# Patient Record
Sex: FEMALE | Race: WHITE | Employment: OTHER | URBAN - METROPOLITAN AREA
[De-identification: names, ages, dates, MRNs, and addresses within clinical notes are randomized per-mention and may not be internally consistent; named-entity substitution may affect disease eponyms.]

---

## 2018-06-15 ENCOUNTER — TRANSFERRED RECORDS (OUTPATIENT)
Dept: HEALTH INFORMATION MANAGEMENT | Facility: CLINIC | Age: 78
End: 2018-06-15

## 2018-06-18 ENCOUNTER — TRANSFERRED RECORDS (OUTPATIENT)
Dept: HEALTH INFORMATION MANAGEMENT | Facility: CLINIC | Age: 78
End: 2018-06-18

## 2018-06-25 ENCOUNTER — TRANSFERRED RECORDS (OUTPATIENT)
Dept: HEALTH INFORMATION MANAGEMENT | Facility: CLINIC | Age: 78
End: 2018-06-25

## 2018-06-26 ENCOUNTER — TRANSFERRED RECORDS (OUTPATIENT)
Dept: HEALTH INFORMATION MANAGEMENT | Facility: CLINIC | Age: 78
End: 2018-06-26

## 2018-07-20 DIAGNOSIS — F41.9 ANXIETY: ICD-10-CM

## 2018-07-20 DIAGNOSIS — R51.9 FACIAL PAIN: Primary | ICD-10-CM

## 2018-07-20 RX ORDER — DIAZEPAM 5 MG
TABLET ORAL
Qty: 2 TABLET | Refills: 0 | Status: SHIPPED | OUTPATIENT
Start: 2018-07-20 | End: 2018-07-23

## 2018-07-23 RX ORDER — DIAZEPAM 5 MG
TABLET ORAL
Qty: 2 TABLET | Refills: 0 | Status: SHIPPED | OUTPATIENT
Start: 2018-07-23

## 2018-08-01 ENCOUNTER — OFFICE VISIT (OUTPATIENT)
Dept: NEUROSURGERY | Facility: CLINIC | Age: 78
End: 2018-08-01
Payer: COMMERCIAL

## 2018-08-01 ENCOUNTER — RADIANT APPOINTMENT (OUTPATIENT)
Dept: MRI IMAGING | Facility: CLINIC | Age: 78
End: 2018-08-01
Attending: NURSE PRACTITIONER
Payer: COMMERCIAL

## 2018-08-01 VITALS
TEMPERATURE: 98.2 F | SYSTOLIC BLOOD PRESSURE: 175 MMHG | BODY MASS INDEX: 21.42 KG/M2 | RESPIRATION RATE: 16 BRPM | OXYGEN SATURATION: 98 % | HEIGHT: 63 IN | HEART RATE: 84 BPM | DIASTOLIC BLOOD PRESSURE: 90 MMHG | WEIGHT: 120.9 LBS

## 2018-08-01 DIAGNOSIS — G50.0 TRIGEMINAL NEURALGIA OF RIGHT SIDE OF FACE: Primary | ICD-10-CM

## 2018-08-01 DIAGNOSIS — F41.0 PANIC ATTACKS: ICD-10-CM

## 2018-08-01 DIAGNOSIS — R51.9 FACIAL PAIN: ICD-10-CM

## 2018-08-01 RX ORDER — TRAMADOL HYDROCHLORIDE 50 MG/1
TABLET ORAL PRN
COMMUNITY
Start: 2018-07-31

## 2018-08-01 RX ORDER — LORAZEPAM 0.5 MG/1
TABLET ORAL PRN
COMMUNITY
Start: 2018-07-13

## 2018-08-01 RX ORDER — BACLOFEN 10 MG/1
TABLET ORAL DAILY
COMMUNITY
Start: 2018-05-09

## 2018-08-01 RX ORDER — LAMOTRIGINE 25 MG/1
TABLET ORAL DAILY
COMMUNITY
Start: 2018-07-06

## 2018-08-01 RX ORDER — MIRTAZAPINE 15 MG/1
TABLET, FILM COATED ORAL DAILY
COMMUNITY
Start: 2018-06-27

## 2018-08-01 RX ORDER — ONDANSETRON 4 MG/1
TABLET, ORALLY DISINTEGRATING ORAL PRN
COMMUNITY
Start: 2018-06-20

## 2018-08-01 RX ORDER — ACETAMINOPHEN 500 MG
TABLET ORAL PRN
COMMUNITY

## 2018-08-01 RX ORDER — BUSPIRONE HYDROCHLORIDE 5 MG/1
TABLET ORAL DAILY
COMMUNITY
Start: 2018-06-27

## 2018-08-01 RX ORDER — AMLODIPINE BESYLATE 2.5 MG/1
TABLET ORAL AT BEDTIME
COMMUNITY

## 2018-08-01 RX ORDER — GADOBUTROL 604.72 MG/ML
7.5 INJECTION INTRAVENOUS ONCE
Status: COMPLETED | OUTPATIENT
Start: 2018-08-01 | End: 2018-08-01

## 2018-08-01 RX ADMIN — GADOBUTROL 5 ML: 604.72 INJECTION INTRAVENOUS at 07:54

## 2018-08-01 ASSESSMENT — PAIN SCALES - GENERAL: PAINLEVEL: NO PAIN (0)

## 2018-08-01 NOTE — DISCHARGE INSTRUCTIONS
MRI Contrast Discharge Instructions    The IV contrast you received today will pass out of your body in your  urine. This will happen in the next 24 hours. You will not feel this process.  Your urine will not change color.    Drink at least 4 extra glasses of water or juice today (unless your doctor  has restricted your fluids). This reduces the stress on your kidneys.  You may take your regular medicines.    If you are on dialysis: It is best to have dialysis today.    If you have a reaction: Most reactions happen right away. If you have  any new symptoms after leaving the hospital (such as hives or swelling),  call your hospital at the correct number below. Or call your family doctor.  If you have breathing distress or wheezing, call 911.    Special instructions: ***    I have read and understand the above information.    Signature:______________________________________ Date:___________    Staff:__________________________________________ Date:___________     Time:__________    [facility] Departments:    ___[place]: [phone]  ___[facility]: [phone]  ___[place]: [phone] ___[facility]: [phone]  ___[facility]: [phone]  ___[facility]: [phone]  ___[phone]  ___[facility]: [phone]  ___[phone]

## 2018-08-01 NOTE — PATIENT INSTRUCTIONS
1.  Will schedule percutaneous radiofrequency rhizotomy procedure with Dr. Ling on August 2, 2018.  Follow-up in clinic or over the phone 2 weeks after the surgery.    2.  Continue to take baclofen and Lamictal before and after the surgery until further instruction by our office.    3.  Call Syl RN Care Coordinator for questions/concerns

## 2018-08-01 NOTE — MR AVS SNAPSHOT
After Visit Summary   8/1/2018    Erma Aguero    MRN: [id number]           Patient Information     Date Of Birth          1940        Visit Information        Provider Department      8/1/2018 9:30 AM Dana Olivas APRN [facility]        Today's Diagnoses     Recurrent right-sided trigeminal neuralgia in V2 distributiion    -  1    Panic attacks          Care Instructions    1.  Will schedule percutaneous radiofrequency rhizotomy procedure with Dr. Ling on August 2, 2018.  Follow-up in clinic or over the phone 2 weeks after the surgery.    2.  Continue to take baclofen and Lamictal before and after the surgery until further instruction by our office.    3.  Call Syl RN Care Coordinator for questions/concerns           Follow-ups after your visit        Who to contact     Please call your clinic at [phone] to:    Ask questions about your health    Make or cancel appointments    Discuss your medicines    Learn about your test results    Speak to your doctor            Additional Information About Your Visit        [facility] Information     [employer] gives you secure access to your electronic health record. If you see a primary care provider, you can also send messages to your care team and make appointments. If you have questions, please call your primary care clinic.  If you do not have a primary care provider, please call [phone] and they will assist you.      [employer] is an electronic gateway that provides easy, online access to your medical records. With [employer], you can request a clinic appointment, read your test results, renew a prescription or communicate with your care team.     To access your existing account, please contact your [facility] or call [phone] for assistance.        Care EveryWhere ID     This is your Care EveryWhere ID. This could be used by other organizations to access your [facility]  "records  [id number]        Your Vitals Were     Pulse Temperature Respirations Height Pulse Oximetry Breastfeeding?    84 98.2  F (36.8  C) (Oral) 16 1.6 m (5' 3\") 98% No    BMI (Body Mass Index)                   21.42 kg/m2            Blood Pressure from Last 3 Encounters:   08/03/18 173/88   08/01/18 175/90    Weight from Last 3 Encounters:   08/03/18 55.1 kg (121 lb 7.6 oz)   08/01/18 54.8 kg (120 lb 14.4 oz)              We Performed the Following     Liv-Operative Worksheet       Information about OPIOIDS     PRESCRIPTION OPIOIDS: WHAT YOU NEED TO KNOW   We gave you an opioid (narcotic) pain medicine. It is important to manage your pain, but opioids are not always the best choice. You should first try all the other options your care team gave you. Take this medicine for as short a time (and as few doses) as possible.     These medicines have risks:    DO NOT drive when on new or higher doses of pain medicine. These medicines can affect your alertness and reaction times, and you could be arrested for driving under the influence (DUI). If you need to use opioids long-term, talk to your care team about driving.    DO NOT operate heave machinery    DO NOT do any other dangerous activities while taking these medicines.     DO NOT drink any alcohol while taking these medicines.      If the opioid prescribed includes acetaminophen, DO NOT take with any other medicines that contain acetaminophen. Read all labels carefully. Look for the word  acetaminophen  or  Tylenol.  Ask your pharmacist if you have questions or are unsure.    You can get addicted to pain medicines, especially if you have a history of addiction (chemical, alcohol or substance dependence). Talk to your care team about ways to reduce this risk.    Store your pills in a secure place, locked if possible. We will not replace any lost or stolen medicine. If you don t finish your medicine, please throw away (dispose) as directed by your pharmacist. The " [place] Pollution Control Agency has more information about safe disposal: https://www.pca.[facility].mn.us/living-green/managing-unwanted-medications.     All opioids tend to cause constipation. Drink plenty of water and eat foods that have a lot of fiber, such as fruits, vegetables, prune juice, apple juice and high-fiber cereal. Take a laxative (Miralax, milk of magnesia, Colace, Senna) if you don t move your bowels at least every other day.          Primary Care Provider Office Phone # Fax #    William Yee [phone]       [facility] [address] [facility] [address]        Equal Access to Services     MARSHALL PISANO : Teodoro Tavarez, yahaira johnston, roxann higgins, douglas ball. So [facility] [phone].    ATENCIÓN: Si habla español, tiene a denton disposición servicios gratuitos de asistencia lingüística. Llame al [phone].    We comply with applicable federal civil rights laws and [place] laws. We do not discriminate on the basis of race, color, national origin, age, disability, sex, sexual orientation, or gender identity.            Thank you!     Thank you for choosing [facility]  for your care. Our goal is always to provide you with excellent care. Hearing back from our patients is one way we can continue to improve our services. Please take a few minutes to complete the written survey that you may receive in the mail after your visit with us. Thank you!             Your Updated Medication List - Protect others around you: Learn how to safely use, store and throw away your medicines at www.disposemymeds.org.          This list is accurate as of 8/1/18 11:59 PM.  Always use your most recent med list.                   Brand Name Dispense Instructions for use Diagnosis    amLODIPine 2.5 MG tablet    NORVASC     At Bedtime        atorvastatin 5 mg half-tab    LIPITOR     At Bedtime        baclofen 10 MG  tablet    LIORESAL     daily        busPIRone 5 MG tablet    BUSPAR     daily        diazepam 5 MG tablet    VALIUM    2 tablet    Take 1 tablet 20 minutes prior to MRI. May repeat if not effective.    Anxiety       lamoTRIgine 25 MG tablet    LaMICtal     daily        LORazepam 0.5 MG tablet    ATIVAN     as needed        mirtazapine 15 MG tablet    REMERON     daily        ondansetron 4 MG ODT tab    ZOFRAN-ODT     as needed        traMADol 50 MG tablet    ULTRAM     as needed        TYLENOL 500 MG tablet   Generic drug:  acetaminophen      as needed

## 2018-08-01 NOTE — LETTER
8/1/2018       RE: Erma Aguero  [address]  [place] [facility] [address]     Dear Colleague,    Thank you for referring your patient, Erma Aguero, to the [facility] NEUROS[facility]GERY at [facility]. Please see a copy of my visit note below.    Neurosurgery Consultation    Erma Aguero MRN# [id number]   YOB: 1940 Age: 77 year old   Date of Service: 08/02/18     Provider Requesting Consultation:   William Yee MD  [facility]   [address] [place] [address]      I had the pleasure of seeing Erma Aguero and [facility] her recurrent right-sided mid  facial pain.         History of Present Illness:   This patient is a 77 year old female with a history of well established right-sided trigeminal neuralgia in V2 distribution, s/p radiofrequency rhizotomy in 10/2000. She presents with acute on chronic recurrent right sided trigeminal neuralgia in V2 distribution since June 2018.  The patient reports that she had 6 years of excellent pain relief from the previous radiofrequency rhizotomy which was done by Dr. Abel Reid at [facility].  She however recall that that experience was quite unpleasant.  She therefore elected to have her trigeminal neuralgia treated medically by her neurologist when the pain reoccurred.  She had tried Tegretol and Lyrica initially but could not tolerate the side effects of anxiety and drowsiness. Eventually, she settled on baclofen, which never completely control the pain.   Currently, she described having frequent paroxysmal lancinating pain attacks that would last up to 2 hours with a new onset of simultaneous burning pain since this last flare up in 6/2018.  Triggers include chewing, talking, touching face, brushing teeth, and cold breeze.  She denies constant background aching pain or dysesthesia when the  paroxysmal pain subsides.  She denies autonomic symptoms, concurrent or history of headaches.  She further denies a history of shingles, Lyme's disease, multiple sclerosis, brain tumors, aneurysms, TMJ/oral/dental disease, Sjogren's, facial or head injuries/traumas, and immunologic disorders.  The patient states that she was recently hospitalized in a neuropsych unit for management of both trigeminal neuralgia and associated panic attacks. She was started on Lamictal in addition.  She feels that the Lamictal has helped but still does not percy her symptoms completely.  She says she continues to live under the fear that she may develop another major pain and panic attack all the time.  For this reason, she is interested in pursuing further surgical intervention.            Past Medical History:   Hyponatremia, arthritis in both knees, hypertension, hyperlipidemia, cervical dysplasia, colon polyps, diverticulosis, eustachian tube dysfunction, chronic tinnitus in both ears without associated hearing deficit, vitamin D deficiency, hypokalemia, hyponatremia, left Lisfranc fracture nonunion and metatarsalgia over the second and fifth metatarsal heads as well as claw deformity of the lesser toes            Past Surgical History:   Status post ORIF and fusion of severe left metatarsal fracture in , status post hardware removal from left foot in  , polypectomy, bilateral tubal ligation, conization of uterine/cervix, and radiofrequency rhizotomy of right trigeminal nerve           Social History:     Social History     Marital status:      Spouse name: N/A     Number of children: N/A     Years of education: N/A     Occupational History      Retired     Social History Main Topics     Smoking status: Never Smoker     Smokeless tobacco: Never Used     Alcohol use No     Drug use: No     Sexual activity: Not on file             Family History:   Mother  from unknown cancer.  Father  from heart disease and  "stroke.            Allergies:     Allergen Reactions     Lanolin Dermatitis     No Clinical Screening - See Comments Rash     wool              Medications:     Current Outpatient Prescriptions:      acetaminophen (TYLENOL) 500 MG tablet, as needed, Disp: , Rfl:      amLODIPine (NORVASC) 2.5 MG tablet, At Bedtime , Disp: , Rfl:      atorvastatin (LIPITOR) 5 mg half-tab, At Bedtime , Disp: , Rfl:      baclofen (LIORESAL) 10 MG tablet, daily, Disp: , Rfl:      busPIRone (BUSPAR) 5 MG tablet, daily, Disp: , Rfl:      diazepam (VALIUM) 5 MG tablet, Take 1 tablet 20 minutes prior to MRI. May repeat if not effective., Disp: 2 tablet, Rfl: 0     lamoTRIgine (LAMICTAL) 25 MG tablet, daily, Disp: , Rfl:      LORazepam (ATIVAN) 0.5 MG tablet, as needed, Disp: , Rfl:      mirtazapine (REMERON) 15 MG tablet, daily, Disp: , Rfl:      ondansetron (ZOFRAN-ODT) 4 MG ODT tab, as needed, Disp: , Rfl:      traMADol (ULTRAM) 50 MG tablet, as needed, Disp: , Rfl:             Review of Systems:     ROS 14 point review of system is stated above in his HPI, PMH, and PSH.  The remaining system is otherwise negative.:            Physical Exam:   /90  Pulse 84  Temp 98.2  F (36.8  C) (Oral)  Resp 16  Ht 1.6 m (5' 3\")  Wt 54.8 kg (120 lb 14.4 oz)  SpO2 98%  Breastfeeding? No  BMI 21.42 kg/m2    General: Well-nourished and well-developed in no acute distress.  HEENT: Head is normocephalic and atraumatic. Neck is supple without adenopathy or thyromegaly. Palpation of her bilateral temporomandibular joint shows no popping, clicking, tenderness and/or swelling. Conjunctivae are anicteric. Oropharynx and nasopharynx are without exudate and/or erythema. Inspection of her oral cavity shows no obvious ulcerations and/or lesions.   Lungs: Clear to auscultation.   Cardiovascular: Heart rate is regular with S1, S2 and no extra sounds.   Abdomen: + bowel sound x 4 quadrants. Soft. None tender. None distended.       Focused Neurologic Exam: "   She is alert, oriented and cooperative. She answers questions and follows commands appropriately in fluent speech and normal tone. Memory and fund of knowledge are normal. Bilateral pupils are round, equal and reactive to light and accommodation. Extraocular movements are intact with no nystagmus and/or disconjugation. Visual fields to direct confrontation are full. Corneal reflexes are positive bilaterally. Masseter and temporalis muscle strength is normal. Facial sensation and expression are symmetric. Hearing is to  baseline bilaterally. Gag reflex is present. Tongue and uvula are midline with normal palate movements and no fasciculation. Trapezius and sternocleidal strength is equivocal. She has no finger-to-nose dysmetria. Finger-nose-finger, heel-to-shin rub and rapid alternating finger and hand taps are normal.   Strength in all 4 extremities is 5/5. Deep tendon reflexes are symmetric. Sensation to light touch is intact in 4 extremities. Romberg test is negative. Tandem walk is normal, as are her station and gait.             Images:   I reviewed her MRI/MRA of brain with my colleague Dr. Jerome Ling.  We agreed with the radiologist interpretation below.  Detailed report and the study are available in [facility] and PACS.    Findings: No abnormal signal or enhancement along the course of the  fifth, seventh and eighth cranial nerves on either side. Vestibular  and auditory structures exhibit normal signal on T2-weighted images  and no abnormal enhancement. There is a small focus of  encephalomalacia in the inferomedial right temporal lobe adjacent to  Meckel's cave. There is vascularity along the the region of the  proximal right 5th cranial nerve adjacent to the nerve root entry  zone, but not directly at the nerve root entry zone. This appears to  represent the superior cerebellar artery.  There are scattered T2 hyperintensities in the periventricular and  subcortical white matter bilaterally representing  Leukoaraiosis.   Images of the whole brain demonstrate no mass lesion, no mass effect,  or midline shift. No intracranial hemorrhage on  susceptibility-weighted images. There is no restricted diffusion.  Ventricles are proportionate to the cerebral sulci. No abnormal  enhancement.      Impression:  Vascularity adjacent to the right 5th cranial nerve  proximally, but not exactly at the nerve root entry zone. It is  uncertain if this is the source of the patient's right-sided  trigeminal neuralgia.     I have personally reviewed the examination and initial interpretation  and I agree with the findings.     ELY CUNNINGHAM MD           Assessment and Recommendation:        1. Recurrent right-sided trigeminal neuralgia in V2 distributiion refractory to polypharmacy.   2. Panic attacks      We discussed several surgical options that are currently offered for those with trigeminal neuralgia, including microvascular decompression (MVD), percutaneous procedures (namely radiofrequency and balloon compression) and Gamma Knife stereotactic radiosurgery (GKRS).       The patient and her family were informed that the relief is often immediate and long lived with MVD. About 70% or 2/3 of patients are still pain free 10 years after the MVD. The incidence of facial anesthesia is much less than percutaneous procedures. The patient was informed that surgery takes about 3 hours to complete followed by 3-4 days of hospitalization and another 4-6 weeks of resting at home for recovery. Potential complications associated with MVD include but not limited to CSF leak, meningitis, partial or complete deafness, transient diplopia, transient facial nerve dysfunction, cerebellar injuries, facial numbness, dysesthesia, possible failure and rarely stroke and death.     As for the percutaneous procedure, the patient was informed that radiofrequency rhizotomy offers a slightly better outcome (average 4-5 years) compared to the balloon compression  and glycerol rhizolysis (average 1.5-2 years). The initial success rate of radiofrequency is about 80-90% and 70-80% for the balloon compression. The procedure can be repeated for future recurrences upon reevaluation.  Potential complications associated with percutaneous procedures include but are not limited to dysesthesia (rarely anesthesia dolorosa), bleeding, meningitis, alternation in salivation, temporary ocular paresis, partial masseter weakness, corneal anesthesia, neuroparalytic keratitis, alternation in lacrimation and possibly failure to respond.  Lastly, we briefly talked about gamma knife radiosurgery (GKRS). The patient was told that about 80-96% of patients would become pain free while the remaining percentage of patients would either have no relief or would need to stay on the medications to achieve total pain relief. The pain free interval lasts on average after this is about 3-4 years. One possible potential complication from the procedure is facial numbness.     She and her family also asked a number of good questions, which I answered to the best of my knowledge today. They wanted to know what I would recommend. I told them that both MVD and repeat radiofrequency rhizotomy are good choices.  We will however able to schedule the rhizotomy as early as 8/3/2018 if she wish to have the procedure done sooner. I told them that we can always leave the MVD as a backup option should she fail to respond to rhizotomy. The patient is agreeable to the plan. She is satisfied with my explanation and would be fine with meeting Dr. Ling, the neurosurgeon, on the day of procedure.    Thank you very much for allowing us to participate in the care of this patient. Please do not hesitate to contact us with questions. We will keep you informed of her progress.     > 50% of the 60 minutes visit today I spent counseling and educating the patient on the surgical options for the problem outlined above    Case and plan  of care were discussed with and agreed by Dr. Jerome Ling.    This note was completed using Dragon voice recognition software.  Although reviewed after completion, some word and grammatical errors may occur.    Dana Olivas DNP, APRN, FNP-BC  [facility]   Department of Neurosurgery  Phone: [phone]  Fax: [phone]

## 2018-08-01 NOTE — NURSING NOTE
Chief Complaint   Patient presents with     Consult     [facility] NEW FACIAL PAIN, MRI PRIOR     ISIS [place]

## 2018-08-02 ENCOUNTER — TRANSFERRED RECORDS (OUTPATIENT)
Dept: HEALTH INFORMATION MANAGEMENT | Facility: CLINIC | Age: 78
End: 2018-08-02

## 2018-08-02 NOTE — PROGRESS NOTES
Neurosurgery Consultation    Erma Aguero MRN# [id number]   YOB: 1940 Age: 77 year old   Date of Service: 08/02/18     Provider Requesting Consultation:   William Yee MD  [facility]   [address] [place] [address]      I had the pleasure of seeing Erma Aguero and his family in the office at Dr. Yee's [facility] in consultation regarding her recurrent right-sided mid  facial pain.         History of Present Illness:   This patient is a 77 year old female with a history of well established right-sided trigeminal neuralgia in V2 distribution, s/p radiofrequency rhizotomy in 10/2000. She presents with acute on chronic recurrent right sided trigeminal neuralgia in V2 distribution since June 2018.  The patient reports that she had 6 years of excellent pain relief from the previous radiofrequency rhizotomy which was done by Dr. Abel Reid at [facility].  She however recall that that experience was quite unpleasant.  She therefore elected to have her trigeminal neuralgia treated medically by her neurologist when the pain reoccurred.  She had tried Tegretol and Lyrica initially but could not tolerate the side effects of anxiety and drowsiness. Eventually, she settled on baclofen, which never completely control the pain.   Currently, she described having frequent paroxysmal lancinating pain attacks that would last up to 2 hours with a new onset of simultaneous burning pain since this last flare up in 6/2018.  Triggers include chewing, talking, touching face, brushing teeth, and cold breeze.  She denies constant background aching pain or dysesthesia when the paroxysmal pain subsides.  She denies autonomic symptoms, concurrent or history of headaches.  She further denies a history of shingles, Lyme's disease, multiple sclerosis, brain tumors, aneurysms, TMJ/oral/dental disease, Sjogren's, facial or head injuries/traumas, and immunologic  disorders.  The patient states that she was recently hospitalized in a neuropsych unit for management of both trigeminal neuralgia and associated panic attacks. She was started on Lamictal in addition.  She feels that the Lamictal has helped but still does not percy her symptoms completely.  She says she continues to live under the fear that she may develop another major pain and panic attack all the time.  For this reason, she is interested in pursuing further surgical intervention.            Past Medical History:   Hyponatremia, arthritis in both knees, hypertension, hyperlipidemia, cervical dysplasia, colon polyps, diverticulosis, eustachian tube dysfunction, chronic tinnitus in both ears without associated hearing deficit, vitamin D deficiency, hypokalemia, hyponatremia, left Lisfranc fracture nonunion and metatarsalgia over the second and fifth metatarsal heads as well as claw deformity of the lesser toes            Past Surgical History:   Status post ORIF and fusion of severe left metatarsal fracture in , status post hardware removal from left foot in  , polypectomy, bilateral tubal ligation, conization of uterine/cervix, and radiofrequency rhizotomy of right trigeminal nerve           Social History:     Social History     Marital status:      Spouse name: N/A     Number of children: N/A     Years of education: N/A     Occupational History      Retired     Social History Main Topics     Smoking status: Never Smoker     Smokeless tobacco: Never Used     Alcohol use No     Drug use: No     Sexual activity: Not on file             Family History:   Mother  from unknown cancer.  Father  from heart disease and stroke.            Allergies:     Allergen Reactions     Lanolin Dermatitis     No Clinical Screening - See Comments Rash     wool              Medications:     Current Outpatient Prescriptions:      acetaminophen (TYLENOL) 500 MG tablet, as needed, Disp: , Rfl:      amLODIPine  "(NORVASC) 2.5 MG tablet, At Bedtime , Disp: , Rfl:      atorvastatin (LIPITOR) 5 mg half-tab, At Bedtime , Disp: , Rfl:      baclofen (LIORESAL) 10 MG tablet, daily, Disp: , Rfl:      busPIRone (BUSPAR) 5 MG tablet, daily, Disp: , Rfl:      diazepam (VALIUM) 5 MG tablet, Take 1 tablet 20 minutes prior to MRI. May repeat if not effective., Disp: 2 tablet, Rfl: 0     lamoTRIgine (LAMICTAL) 25 MG tablet, daily, Disp: , Rfl:      LORazepam (ATIVAN) 0.5 MG tablet, as needed, Disp: , Rfl:      mirtazapine (REMERON) 15 MG tablet, daily, Disp: , Rfl:      ondansetron (ZOFRAN-ODT) 4 MG ODT tab, as needed, Disp: , Rfl:      traMADol (ULTRAM) 50 MG tablet, as needed, Disp: , Rfl:             Review of Systems:     ROS 14 point review of system is stated above in his HPI, PMH, and PSH.  The remaining system is otherwise negative.:            Physical Exam:   /90  Pulse 84  Temp 98.2  F (36.8  C) (Oral)  Resp 16  Ht 1.6 m (5' 3\")  Wt 54.8 kg (120 lb 14.4 oz)  SpO2 98%  Breastfeeding? No  BMI 21.42 kg/m2    General: Well-nourished and well-developed in no acute distress.  HEENT: Head is normocephalic and atraumatic. Neck is supple without adenopathy or thyromegaly. Palpation of her bilateral temporomandibular joint shows no popping, clicking, tenderness and/or swelling. Conjunctivae are anicteric. Oropharynx and nasopharynx are without exudate and/or erythema. Inspection of her oral cavity shows no obvious ulcerations and/or lesions.   Lungs: Clear to auscultation.   Cardiovascular: Heart rate is regular with S1, S2 and no extra sounds.   Abdomen: + bowel sound x 4 quadrants. Soft. None tender. None distended.       Focused Neurologic Exam:   She is alert, oriented and cooperative. She answers questions and follows commands appropriately in fluent speech and normal tone. Memory and fund of knowledge are normal. Bilateral pupils are round, equal and reactive to light and accommodation. Extraocular movements are " intact with no nystagmus and/or disconjugation. Visual fields to direct confrontation are full. Corneal reflexes are positive bilaterally. Masseter and temporalis muscle strength is normal. Facial sensation and expression are symmetric. Hearing is to  baseline bilaterally. Gag reflex is present. Tongue and uvula are midline with normal palate movements and no fasciculation. Trapezius and sternocleidal strength is equivocal. She has no finger-to-nose dysmetria. Finger-nose-finger, heel-to-shin rub and rapid alternating finger and hand taps are normal.   Strength in all 4 extremities is 5/5. Deep tendon reflexes are symmetric. Sensation to light touch is intact in 4 extremities. Romberg test is negative. Tandem walk is normal, as are her station and gait.             Images:   I reviewed her MRI/MRA of brain with my colleague Dr. Jerome Ling.  We agreed with the radiologist interpretation below.  Detailed report and the study are available in EPIC and PACS.    Findings: No abnormal signal or enhancement along the course of the  fifth, seventh and eighth cranial nerves on either side. Vestibular  and auditory structures exhibit normal signal on T2-weighted images  and no abnormal enhancement. There is a small focus of  encephalomalacia in the inferomedial right temporal lobe adjacent to  Meckel's cave. There is vascularity along the the region of the  proximal right 5th cranial nerve adjacent to the nerve root entry  zone, but not directly at the nerve root entry zone. This appears to  represent the superior cerebellar artery.  There are scattered T2 hyperintensities in the periventricular and  subcortical white matter bilaterally representing Leukoaraiosis.   Images of the whole brain demonstrate no mass lesion, no mass effect,  or midline shift. No intracranial hemorrhage on  susceptibility-weighted images. There is no restricted diffusion.  Ventricles are proportionate to the cerebral sulci. No  abnormal  enhancement.      Impression:  Vascularity adjacent to the right 5th cranial nerve  proximally, but not exactly at the nerve root entry zone. It is  uncertain if this is the source of the patient's right-sided  trigeminal neuralgia.     I have personally reviewed the examination and initial interpretation  and I agree with the findings.     ELY CUNNINGHAM MD           Assessment and Recommendation:        1. Recurrent right-sided trigeminal neuralgia in V2 distributiion refractory to polypharmacy.   2. Panic attacks      We discussed several surgical options that are currently offered for those with trigeminal neuralgia, including microvascular decompression (MVD), percutaneous procedures (namely radiofrequency and balloon compression) and Gamma Knife stereotactic radiosurgery (GKRS).       The patient and her family were informed that the relief is often immediate and long lived with MVD. About 70% or 2/3 of patients are still pain free 10 years after the MVD. The incidence of facial anesthesia is much less than percutaneous procedures. The patient was informed that surgery takes about 3 hours to complete followed by 3-4 days of hospitalization and another 4-6 weeks of resting at home for recovery. Potential complications associated with MVD include but not limited to CSF leak, meningitis, partial or complete deafness, transient diplopia, transient facial nerve dysfunction, cerebellar injuries, facial numbness, dysesthesia, possible failure and rarely stroke and death.     As for the percutaneous procedure, the patient was informed that radiofrequency rhizotomy offers a slightly better outcome (average 4-5 years) compared to the balloon compression and glycerol rhizolysis (average 1.5-2 years). The initial success rate of radiofrequency is about 80-90% and 70-80% for the balloon compression. The procedure can be repeated for future recurrences upon reevaluation.  Potential complications associated with  percutaneous procedures include but are not limited to dysesthesia (rarely anesthesia dolorosa), bleeding, meningitis, alternation in salivation, temporary ocular paresis, partial masseter weakness, corneal anesthesia, neuroparalytic keratitis, alternation in lacrimation and possibly failure to respond.  Lastly, we briefly talked about gamma knife radiosurgery (GKRS). The patient was told that about 80-96% of patients would become pain free while the remaining percentage of patients would either have no relief or would need to stay on the medications to achieve total pain relief. The pain free interval lasts on average after this is about 3-4 years. One possible potential complication from the procedure is facial numbness.     She and her family also asked a number of good questions, which I answered to the best of my knowledge today. They wanted to know what I would recommend. I told them that both MVD and repeat radiofrequency rhizotomy are good choices.  We will however able to schedule the rhizotomy as early as 8/3/2018 if she wish to have the procedure done sooner. I told them that we can always leave the MVD as a backup option should she fail to respond to rhizotomy. The patient is agreeable to the plan. She is satisfied with my explanation and would be fine with meeting Dr. Ling, the neurosurgeon, on the day of procedure.    Thank you very much for allowing us to participate in the care of this patient. Please do not hesitate to contact us with questions. We will keep you informed of her progress.     > 50% of the 60 minutes visit today I spent counseling and educating the patient on the surgical options for the problem outlined above    Case and plan of care were discussed with and agreed by Dr. Jerome Ling.    This note was completed using Dragon voice recognition software.  Although reviewed after completion, some word and grammatical errors may occur.    Dana Olivas, FLO, APRN, FNP-[facility]  [facility]   Department of Neurosurgery  Phone: [phone]  Fax: [phone]

## 2018-08-03 ENCOUNTER — SURGERY (OUTPATIENT)
Age: 78
End: 2018-08-03

## 2018-08-03 ENCOUNTER — APPOINTMENT (OUTPATIENT)
Dept: GENERAL RADIOLOGY | Facility: CLINIC | Age: 78
End: 2018-08-03
Attending: NEUROLOGICAL SURGERY
Payer: MEDICARE

## 2018-08-03 ENCOUNTER — ANESTHESIA EVENT (OUTPATIENT)
Dept: SURGERY | Facility: CLINIC | Age: 78
End: 2018-08-03
Payer: MEDICARE

## 2018-08-03 ENCOUNTER — ANESTHESIA (OUTPATIENT)
Dept: SURGERY | Facility: CLINIC | Age: 78
End: 2018-08-03
Payer: MEDICARE

## 2018-08-03 ENCOUNTER — HOSPITAL ENCOUNTER (OUTPATIENT)
Facility: CLINIC | Age: 78
Discharge: HOME | End: 2018-08-03
Attending: NEUROLOGICAL SURGERY | Admitting: NEUROLOGICAL SURGERY
Payer: MEDICARE

## 2018-08-03 VITALS
DIASTOLIC BLOOD PRESSURE: 88 MMHG | HEIGHT: 63 IN | TEMPERATURE: 98 F | RESPIRATION RATE: 16 BRPM | WEIGHT: 121.47 LBS | BODY MASS INDEX: 21.52 KG/M2 | SYSTOLIC BLOOD PRESSURE: 173 MMHG | OXYGEN SATURATION: 97 %

## 2018-08-03 LAB — GLUCOSE BLDC GLUCOMTR-MCNC: 89 MG/DL (ref 70–99)

## 2018-08-03 PROCEDURE — 82962 GLUCOSE BLOOD TEST: CPT

## 2018-08-03 PROCEDURE — 25000128 H RX IP 250 OP 636: Performed by: ANESTHESIOLOGY

## 2018-08-03 PROCEDURE — 25000128 H RX IP 250 OP 636: Performed by: STUDENT IN AN ORGANIZED HEALTH CARE EDUCATION/TRAINING PROGRAM

## 2018-08-03 PROCEDURE — 71000027 ZZH RECOVERY PHASE 2 EACH 15 MINS: Performed by: NEUROLOGICAL SURGERY

## 2018-08-03 PROCEDURE — 25000125 ZZHC RX 250: Performed by: NURSE ANESTHETIST, CERTIFIED REGISTERED

## 2018-08-03 PROCEDURE — 36000061 ZZH SURGERY LEVEL 3 W FLUORO 1ST 30 MIN - UMMC: Performed by: NEUROLOGICAL SURGERY

## 2018-08-03 PROCEDURE — 40000277 XR SURGERY CARM FLUORO LESS THAN 5 MIN W STILLS: Mod: TC

## 2018-08-03 PROCEDURE — 36000059 ZZH SURGERY LEVEL 3 EA 15 ADDTL MIN UMMC: Performed by: NEUROLOGICAL SURGERY

## 2018-08-03 PROCEDURE — 37000008 ZZH ANESTHESIA TECHNICAL FEE, 1ST 30 MIN: Performed by: NEUROLOGICAL SURGERY

## 2018-08-03 PROCEDURE — 25000128 H RX IP 250 OP 636: Performed by: NURSE ANESTHETIST, CERTIFIED REGISTERED

## 2018-08-03 PROCEDURE — 27210794 ZZH OR GENERAL SUPPLY STERILE: Performed by: NEUROLOGICAL SURGERY

## 2018-08-03 PROCEDURE — 25000132 ZZH RX MED GY IP 250 OP 250 PS 637: Mod: GY | Performed by: STUDENT IN AN ORGANIZED HEALTH CARE EDUCATION/TRAINING PROGRAM

## 2018-08-03 PROCEDURE — 37000009 ZZH ANESTHESIA TECHNICAL FEE, EACH ADDTL 15 MIN: Performed by: NEUROLOGICAL SURGERY

## 2018-08-03 PROCEDURE — A9270 NON-COVERED ITEM OR SERVICE: HCPCS | Mod: GY | Performed by: STUDENT IN AN ORGANIZED HEALTH CARE EDUCATION/TRAINING PROGRAM

## 2018-08-03 PROCEDURE — 25000125 ZZHC RX 250: Performed by: NEUROLOGICAL SURGERY

## 2018-08-03 PROCEDURE — 40000170 ZZH STATISTIC PRE-PROCEDURE ASSESSMENT II: Performed by: NEUROLOGICAL SURGERY

## 2018-08-03 RX ORDER — SODIUM CHLORIDE, SODIUM LACTATE, POTASSIUM CHLORIDE, CALCIUM CHLORIDE 600; 310; 30; 20 MG/100ML; MG/100ML; MG/100ML; MG/100ML
INJECTION, SOLUTION INTRAVENOUS CONTINUOUS PRN
Status: DISCONTINUED | OUTPATIENT
Start: 2018-08-03 | End: 2018-08-03

## 2018-08-03 RX ORDER — CEFAZOLIN SODIUM 1 G/3ML
1 INJECTION, POWDER, FOR SOLUTION INTRAMUSCULAR; INTRAVENOUS SEE ADMIN INSTRUCTIONS
Status: DISCONTINUED | OUTPATIENT
Start: 2018-08-03 | End: 2018-08-03 | Stop reason: HOSPADM

## 2018-08-03 RX ORDER — FENTANYL CITRATE 50 UG/ML
INJECTION, SOLUTION INTRAMUSCULAR; INTRAVENOUS PRN
Status: DISCONTINUED | OUTPATIENT
Start: 2018-08-03 | End: 2018-08-03

## 2018-08-03 RX ORDER — ONDANSETRON 2 MG/ML
4 INJECTION INTRAMUSCULAR; INTRAVENOUS EVERY 30 MIN PRN
Status: DISCONTINUED | OUTPATIENT
Start: 2018-08-03 | End: 2018-08-03 | Stop reason: HOSPADM

## 2018-08-03 RX ORDER — LIDOCAINE HYDROCHLORIDE 10 MG/ML
INJECTION, SOLUTION EPIDURAL; INFILTRATION; INTRACAUDAL; PERINEURAL PRN
Status: DISCONTINUED | OUTPATIENT
Start: 2018-08-03 | End: 2018-08-03 | Stop reason: HOSPADM

## 2018-08-03 RX ORDER — NALOXONE HYDROCHLORIDE 0.4 MG/ML
.1-.4 INJECTION, SOLUTION INTRAMUSCULAR; INTRAVENOUS; SUBCUTANEOUS
Status: DISCONTINUED | OUTPATIENT
Start: 2018-08-03 | End: 2018-08-03 | Stop reason: HOSPADM

## 2018-08-03 RX ORDER — IBUPROFEN 400 MG/1
400 TABLET, FILM COATED ORAL ONCE
Status: COMPLETED | OUTPATIENT
Start: 2018-08-03 | End: 2018-08-03

## 2018-08-03 RX ORDER — MEPERIDINE HYDROCHLORIDE 25 MG/ML
12.5 INJECTION INTRAMUSCULAR; INTRAVENOUS; SUBCUTANEOUS
Status: DISCONTINUED | OUTPATIENT
Start: 2018-08-03 | End: 2018-08-03 | Stop reason: HOSPADM

## 2018-08-03 RX ORDER — CEFAZOLIN SODIUM 2 G/100ML
2 INJECTION, SOLUTION INTRAVENOUS
Status: COMPLETED | OUTPATIENT
Start: 2018-08-03 | End: 2018-08-03

## 2018-08-03 RX ORDER — FENTANYL CITRATE 50 UG/ML
25-50 INJECTION, SOLUTION INTRAMUSCULAR; INTRAVENOUS
Status: DISCONTINUED | OUTPATIENT
Start: 2018-08-03 | End: 2018-08-03 | Stop reason: HOSPADM

## 2018-08-03 RX ORDER — FENTANYL CITRATE 50 UG/ML
25-50 INJECTION, SOLUTION INTRAMUSCULAR; INTRAVENOUS EVERY 5 MIN PRN
Status: DISCONTINUED | OUTPATIENT
Start: 2018-08-03 | End: 2018-08-03 | Stop reason: HOSPADM

## 2018-08-03 RX ORDER — LABETALOL HYDROCHLORIDE 5 MG/ML
10 INJECTION, SOLUTION INTRAVENOUS
Status: DISCONTINUED | OUTPATIENT
Start: 2018-08-03 | End: 2018-08-03 | Stop reason: HOSPADM

## 2018-08-03 RX ORDER — ONDANSETRON 4 MG/1
4 TABLET, ORALLY DISINTEGRATING ORAL EVERY 30 MIN PRN
Status: DISCONTINUED | OUTPATIENT
Start: 2018-08-03 | End: 2018-08-03 | Stop reason: HOSPADM

## 2018-08-03 RX ORDER — HYDRALAZINE HYDROCHLORIDE 20 MG/ML
2.5-5 INJECTION INTRAMUSCULAR; INTRAVENOUS EVERY 10 MIN PRN
Status: DISCONTINUED | OUTPATIENT
Start: 2018-08-03 | End: 2018-08-03 | Stop reason: HOSPADM

## 2018-08-03 RX ORDER — SODIUM CHLORIDE, SODIUM LACTATE, POTASSIUM CHLORIDE, CALCIUM CHLORIDE 600; 310; 30; 20 MG/100ML; MG/100ML; MG/100ML; MG/100ML
INJECTION, SOLUTION INTRAVENOUS CONTINUOUS
Status: DISCONTINUED | OUTPATIENT
Start: 2018-08-03 | End: 2018-08-03 | Stop reason: HOSPADM

## 2018-08-03 RX ADMIN — METHOHEXITAL SODIUM 10 MG: 500 INJECTION, POWDER, LYOPHILIZED, FOR SOLUTION INTRAMUSCULAR; INTRAVENOUS; RECTAL at 07:52

## 2018-08-03 RX ADMIN — FENTANYL CITRATE 25 MCG: 50 INJECTION INTRAMUSCULAR; INTRAVENOUS at 08:40

## 2018-08-03 RX ADMIN — CEFAZOLIN SODIUM 2 G: 2 INJECTION, SOLUTION INTRAVENOUS at 07:28

## 2018-08-03 RX ADMIN — METHOHEXITAL SODIUM 10 MG: 500 INJECTION, POWDER, LYOPHILIZED, FOR SOLUTION INTRAMUSCULAR; INTRAVENOUS; RECTAL at 08:08

## 2018-08-03 RX ADMIN — FENTANYL CITRATE 5 MCG: 50 INJECTION, SOLUTION INTRAMUSCULAR; INTRAVENOUS at 07:55

## 2018-08-03 RX ADMIN — METHOHEXITAL SODIUM 10 MG: 500 INJECTION, POWDER, LYOPHILIZED, FOR SOLUTION INTRAMUSCULAR; INTRAVENOUS; RECTAL at 07:56

## 2018-08-03 RX ADMIN — SODIUM CHLORIDE, POTASSIUM CHLORIDE, SODIUM LACTATE AND CALCIUM CHLORIDE: 600; 310; 30; 20 INJECTION, SOLUTION INTRAVENOUS at 07:16

## 2018-08-03 RX ADMIN — METHOHEXITAL SODIUM 10 MG: 500 INJECTION, POWDER, LYOPHILIZED, FOR SOLUTION INTRAMUSCULAR; INTRAVENOUS; RECTAL at 07:48

## 2018-08-03 RX ADMIN — FENTANYL CITRATE 5 MCG: 50 INJECTION, SOLUTION INTRAMUSCULAR; INTRAVENOUS at 07:35

## 2018-08-03 RX ADMIN — METHOHEXITAL SODIUM 20 MG: 500 INJECTION, POWDER, LYOPHILIZED, FOR SOLUTION INTRAMUSCULAR; INTRAVENOUS; RECTAL at 08:07

## 2018-08-03 RX ADMIN — IBUPROFEN 400 MG: 400 TABLET ORAL at 09:19

## 2018-08-03 RX ADMIN — METHOHEXITAL SODIUM 10 MG: 500 INJECTION, POWDER, LYOPHILIZED, FOR SOLUTION INTRAMUSCULAR; INTRAVENOUS; RECTAL at 07:47

## 2018-08-03 RX ADMIN — LIDOCAINE HYDROCHLORIDE 1 ML: 10 INJECTION, SOLUTION EPIDURAL; INFILTRATION; INTRACAUDAL; PERINEURAL at 07:57

## 2018-08-03 RX ADMIN — FENTANYL CITRATE 5 MCG: 50 INJECTION, SOLUTION INTRAMUSCULAR; INTRAVENOUS at 08:07

## 2018-08-03 RX ADMIN — FENTANYL CITRATE 25 MCG: 50 INJECTION INTRAMUSCULAR; INTRAVENOUS at 08:47

## 2018-08-03 RX ADMIN — FENTANYL CITRATE 5 MCG: 50 INJECTION, SOLUTION INTRAMUSCULAR; INTRAVENOUS at 07:46

## 2018-08-03 RX ADMIN — METHOHEXITAL SODIUM 10 MG: 500 INJECTION, POWDER, LYOPHILIZED, FOR SOLUTION INTRAMUSCULAR; INTRAVENOUS; RECTAL at 07:58

## 2018-08-03 RX ADMIN — METHOHEXITAL SODIUM 20 MG: 500 INJECTION, POWDER, LYOPHILIZED, FOR SOLUTION INTRAMUSCULAR; INTRAVENOUS; RECTAL at 08:06

## 2018-08-03 RX ADMIN — FENTANYL CITRATE 5 MCG: 50 INJECTION, SOLUTION INTRAMUSCULAR; INTRAVENOUS at 08:04

## 2018-08-03 RX ADMIN — METHOHEXITAL SODIUM 10 MG: 500 INJECTION, POWDER, LYOPHILIZED, FOR SOLUTION INTRAMUSCULAR; INTRAVENOUS; RECTAL at 08:00

## 2018-08-03 RX ADMIN — METHOHEXITAL SODIUM 10 MG: 500 INJECTION, POWDER, LYOPHILIZED, FOR SOLUTION INTRAMUSCULAR; INTRAVENOUS; RECTAL at 07:55

## 2018-08-03 RX ADMIN — METHOHEXITAL SODIUM 10 MG: 500 INJECTION, POWDER, LYOPHILIZED, FOR SOLUTION INTRAMUSCULAR; INTRAVENOUS; RECTAL at 07:50

## 2018-08-03 RX ADMIN — FENTANYL CITRATE 5 MCG: 50 INJECTION, SOLUTION INTRAMUSCULAR; INTRAVENOUS at 08:00

## 2018-08-03 RX ADMIN — METHOHEXITAL SODIUM 10 MG: 500 INJECTION, POWDER, LYOPHILIZED, FOR SOLUTION INTRAMUSCULAR; INTRAVENOUS; RECTAL at 07:59

## 2018-08-03 RX ADMIN — METHOHEXITAL SODIUM 10 MG: 500 INJECTION, POWDER, LYOPHILIZED, FOR SOLUTION INTRAMUSCULAR; INTRAVENOUS; RECTAL at 07:46

## 2018-08-03 NOTE — ANESTHESIA PREPROCEDURE EVALUATION
Anesthesia Evaluation     . Pt has had prior anesthetic. Type: General and MAC           ROS/MED HX    ENT/Pulmonary:       Neurologic:       Cardiovascular:     (+) Dyslipidemia, hypertension----. : . . . :. .       METS/Exercise Tolerance:     Hematologic:         Musculoskeletal:         GI/Hepatic:         Renal/Genitourinary:         Endo:         Psychiatric:         Infectious Disease:         Malignancy:         Other:                     Physical Exam  Normal systems: cardiovascular, pulmonary and dental    Airway   Mallampati: I  TM distance: >3 FB  Neck ROM: full    Dental     Cardiovascular   Rhythm and rate: regular and normal      Pulmonary    breath sounds clear to auscultation                    Anesthesia Plan      History & Physical Review  History and physical reviewed and following examination; no interval change.    ASA Status:  3 .    NPO Status:  > 8 hours    Plan for MAC with Intravenous and Propofol induction. Maintenance will be TIVA.  Reason for MAC:  Deep or markedly invasive procedure (G8)  PONV prophylaxis:  Ondansetron (or other 5HT-3) and Dexamethasone or Solumedrol  Additional equipment: 2nd IV      Postoperative Care  Postoperative pain management:  IV analgesics.      Consents  Anesthetic plan, risks, benefits and alternatives discussed with:  Patient.  Use of blood products discussed: Yes.   Use of blood products discussed with Patient.  Consented to blood products.  .                          .

## 2018-08-03 NOTE — ANESTHESIA POSTPROCEDURE EVALUATION
Patient: Erma Aguero    Procedure(s):  Radio Frequency Percutaneous Right Trigeminal Rhizotomy - Wound Class: I-Clean    Diagnosis:Trigeminal Neuralgia   Diagnosis Additional Information: No value filed.    Anesthesia Type:  MAC    Note:  Anesthesia Post Evaluation    Patient location during evaluation: Phase 2  Patient participation: Able to fully participate in evaluation  Level of consciousness: awake and alert  Pain management: adequate  Airway patency: patent  Cardiovascular status: acceptable  Respiratory status: acceptable  Hydration status: acceptable  PONV: none     Anesthetic complications: None          Last vitals:  Vitals:    08/03/18 0606 08/03/18 0831   BP: (!) 171/95    Resp:  16   Temp: 36.6  C (97.9  F) 36.5  C (97.7  F)   SpO2: 99% 100%         Electronically Signed By: Mauricio Pierce MD  August 3, 2018  8:39 AM

## 2018-08-03 NOTE — OP NOTE
Procedure Date: 08/03/2018      PREOPERATIVE HISTORY:  Ms. Aguero has right-sided V2 trigeminal neuralgia.  She has had a percutaneous radiofrequency rhizotomy in the past and got a number of years of good relief, but it recurred recently.  After discussing the various options, she was brought to the operating room for a repeat radiofrequency trigeminal rhizotomy on the right.      PREOPERATIVE DIAGNOSIS:  Recurrent trigeminal neuralgia.      POSTOPERATIVE DIAGNOSIS:  Recurrent trigeminal neuralgia.      PROCEDURES:   1. Stereotactic radiofrequency trigeminal rhizotomy, right  2. Intraoperative fluoroscopic guidance    SURGEON:  Jerome Ling MD.      ASSISTANT:  Louie Ramsey MD.      DESCRIPTION OF PROCEDURE:      The patient was brought into the operating room, anesthesia established an IV and gave preoperative sedation.  Two c-arm fluoroscopes were set up to provide AP and lateral fluoroscopy for the procedure. We prepared the skin over the right cheek.    A point approximately 2.5 cm lateral to the oral fissure was infiltrated with a small amount of lidocaine. Entry into the skin was made with a 15-gauge needle, and then, the Cosman radiofrequency needle with obturator in place was passed through the cheek, taking care not to enter the oral cavity.  With fluoroscopic guidance, the needle was passed to the foramen ovale and the position confirmed fluoroscopically by identifying foramen ovale on an AP film and the clival line on the lateral film.      With the first position of the straight electrode near the clival line, we got reliable V3 stimulation.  The electrode was advanced several millimeters and the impedance dropped from 230 ohms to 205 ohms.  Here, we got reliable V2 stimulation with a threshold of 0.15 volts.  We deepened the anesthesia and made a 70-degree lesion for 60 seconds.     We terminated the procedure by removing the needle.  The patient was returned to the recovery room for  observation.  There was no blood loss, and the patient tolerated the procedure well.     I, Dr. Jerome Ling was, present for the entire procedure.         JEROME LING MD             D: 2018   T: 2018   MT: VALDEZ      Name:     OLLIE BETHEA   MRN:      [id number]        Account:        [id number]   :      1940           Procedure Date: 2018      Document: [id number]

## 2018-08-03 NOTE — DISCHARGE INSTRUCTIONS
[facility]  Same-Day Surgery   Adult Discharge Orders & Instructions       For 24 hours after surgery    1. Get plenty of rest.  A responsible adult must stay with you for at least 24 hours after you leave the hospital.   2. Do not drive or use heavy equipment.  If you have weakness or tingling, don't drive or use heavy equipment until this feeling goes away.  3. Do not drink alcohol.  4. Avoid strenuous or risky activities.  Ask for help when climbing stairs.   5. You may feel lightheaded.  IF so, sit for a few minutes before standing.  Have someone help you get up.   6. If you have nausea (feel sick to your stomach): Drink only clear liquids such as apple juice, ginger ale, broth or 7-Up.  Rest may also help.  Be sure to drink enough fluids.  Move to a regular diet as you feel able.  7. You may have a slight fever. Call the doctor if your fever is over 100 F (37.7 C) (taken under the tongue) or lasts longer than 24 hours.  8. You may have a dry mouth, a sore throat, muscle aches or trouble sleeping.  These should go away after 24 hours.  9. Do not make important or legal decisions.   Call your doctor for any of the followin.  Signs of infection (fever, growing tenderness at the surgery site, a large amount of drainage or bleeding, severe pain, foul-smelling drainage, redness, swelling).    2. It has been over 8 to 10 hours since surgery and you are still not able to urinate (pass water).    3.  Headache for over 24 hours.    4.  Numbness, tingling or weakness the day after surgery (if you had spinal anesthesia).  To contact a doctor, call ___Dr Ling' neurosurgery clinic @ [phone] before 5 pm, then call [phone]_____ or:    X   [phone] and ask for the resident on call for   ____________Neurosurgery__________________________________ (answered 24 hours a day)  X   Emergency Department:    [facility]: [phone]

## 2018-08-03 NOTE — IP AVS SNAPSHOT
Same Day Surgery [address] [address]    Phone:  [phone]                                       After Visit Summary   8/3/2018    Erma Aguero    MRN: [id number]           After Visit Summary Signature Page     I have received my discharge instructions, and my questions have been answered. I have discussed any challenges I see with this plan with the nurse or doctor.    ..........................................................................................................................................  Patient/Patient Representative Signature      ..........................................................................................................................................  Patient Representative Print Name and Relationship to Patient    ..................................................               ................................................  Date                                            Time    ..........................................................................................................................................  Reviewed by Signature/Title    ...................................................              ..............................................  Date                                                            Time

## 2018-08-03 NOTE — ANESTHESIA CARE TRANSFER NOTE
Patient: Erma Aguero    Procedure(s):  Radio Frequency Percutaneous Right Trigeminal Rhizotomy - Wound Class: I-Clean    Diagnosis: Trigeminal Neuralgia   Diagnosis Additional Information: No value filed.    Anesthesia Type:   MAC     Note:  Airway :Room Air  Patient transferred to:Phase II  Comments: Pt. Pink and breathing spontaneously.  Vitals stable.  Report given to oncoming nurse.  Transfer of care occurred.Handoff Report: Identifed the Patient, Identified the Reponsible Provider, Reviewed the pertinent medical history, Discussed the surgical course, Reviewed Intra-OP anesthesia mangement and issues during anesthesia, Set expectations for post-procedure period and Allowed opportunity for questions and acknowledgement of understanding      Vitals: (Last set prior to Anesthesia Care Transfer)    CRNA VITALS  8/3/2018 0745 - 8/3/2018 0829      8/3/2018             Resp Rate (set): 10                Electronically Signed By: JESSICA Gutierrez CRNA  August 3, 2018  8:29 AM

## 2018-08-03 NOTE — IP AVS SNAPSHOT
MRN:[id number]                      After Visit Summary   8/3/2018    Erma Aguero    MRN: [id number]           Thank you!     Thank you for choosing [facility] for your care. Our goal is always to provide you with excellent care. Hearing back from our patients is one way we can continue to improve our services. Please take a few minutes to complete the written survey that you may receive in the mail after you visit with us. Thank you!        Patient Information     Date Of Birth          1940        About your hospital stay     You were admitted on:  August 3, 2018 You last received care in the:  Same Day Surgery [facility]    You were discharged on:  August 3, 2018        Reason for your hospital stay       You underwent a percutaneous rhizotomy.  Our physician's assistant, Mariana Grady, will be calling you within 1-3 days to discuss your trigeminal medications.   - If you have not heard from our clinic about your follow up visit by 3-4 days following your discharge, please call our clinic at [phone] to schedule an appointment with the Neurosurgery teams.   This surgery was done by Jerome Ling MD      After discharge, your activity restrictions are:   -We encourage short frequent walks, increasing as tolerated.  - No driving until you are seen in clinic and cleared by your neurosurgeon.  If you have had a seizure, you may not drive for at least 3 months according to [place] law.    - No strenuous activity.  - No lifting more than 10 pounds until you are seen in clinic (a gallon of milk weighs approximately 8 pounds)    Wound cares after surgery  - You are ok to shower, but do not soak your incisions. Pat them dry if they get damp.   - Avoid coloring your hair or permanent styling until cleared by your surgeon  - No baths, hot tubs or pools for 4-6 weeks after surgery.       Call if you have any of the followin. Temperature greater than 101.5 F.   2. Any redness,  swelling or discharge from the wound.   3. Any new weakness, numbness or altered mental status.  4. Worsening pain that is not improving with the pain medications you were prescribed.     Call [phone] or after 5:00 pm or on weekends call [phone] and ask for the neurosurgery resident on call. Thank you.                  Who to Call     For medical emergencies, please call 911.  For non-urgent questions about your medical care, please call your primary care provider or clinic, [phone]  For questions related to your surgery, please call your surgery clinic        Attending Provider     Provider Jerome Trejo MD Neurosurgery       Primary Care Provider Office Phone # Fax #    William eYe [phone]       When to contact your care team       Call if you have any of the followin. Temperature greater than 101.5 F.   2. Any redness, swelling or discharge from the wound.   3. Any new weakness, numbness or altered mental status.  4. Worsening pain that is not improving with the pain medications you were prescribed.                  After Care Instructions     Activity       After discharge, your activity restrictions are:   -We encourage short frequent walks, increasing as tolerated.  - No driving until you are seen in clinic and cleared by your neurosurgeon.  If you have had a seizure, you may not drive for at least 3 months according to [place] law.    - No strenuous activity.  - No lifting more than 10 pounds until you are seen in clinic (a gallon of milk weighs approximately 8 pounds)            Diet       Follow this diet upon discharge: Regular Diet            Wound care and dressings       Wound cares after surgery  - You are ok to shower, but do not soak your incisions. Pat them dry if they get damp.   - Avoid coloring your hair or permanent styling until cleared by your surgeon  - No baths, hot tubs or pools for 4-6 weeks after surgery.                   Follow-up Appointments     Adult [facility] Follow-up and recommended labs and tests       Follow up with Dana Olivas, at the [facility], in 2 weeks to evaluate after surgery. No follow up labs or test are needed.    Appointments on [place] and/or [facility] (with [facility] or [facility] provider or service). Call [phone] if you haven't heard regarding these appointments within 7 days of discharge.                  Further instructions from your care team       [facility]   Adult Discharge Orders & Instructions     For 24 hours after surgery    1. Get plenty of rest.  A responsible adult must stay with you for at least 24 hours after you leave the hospital.   2. Do not drive or use heavy equipment.  If you have weakness or tingling, don't drive or use heavy equipment until this feeling goes away.  3. Do not drink alcohol.  4. Avoid strenuous or risky activities.  Ask for help when climbing stairs.   5. You may feel lightheaded.  IF so, sit for a few minutes before standing.  Have someone help you get up.   6. If you have nausea (feel sick to your stomach): Drink only clear liquids such as apple juice, ginger ale, broth or 7-Up.  Rest may also help.  Be sure to drink enough fluids.  Move to a regular diet as you feel able.  7. You may have a slight fever. Call the doctor if your fever is over 100 F (37.7 C) (taken under the tongue) or lasts longer than 24 hours.  8. You may have a dry mouth, a sore throat, muscle aches or trouble sleeping.  These should go away after 24 hours.  9. Do not make important or legal decisions.   Call your doctor for any of the followin.  Signs of infection (fever, growing tenderness at the surgery site, a large amount of drainage or bleeding, severe pain, foul-smelling drainage, redness, swelling).    2. It has been over 8 to 10 hours since surgery and you are still not able to  "urinate (pass water).    3.  Headache for over 24 hours.    4.  Numbness, tingling or weakness the day after surgery (if you had spinal anesthesia).  To contact a doctor, call ___Dr Ling' neurosurgery clinic @ [phone] before 5 pm, then call [phone]_____ or:    X   [phone] and ask for the resident on call for   ____________Neurosurgery__________________________________ (answered 24 hours a day)  X   Emergency Department:    [facility]: [phone]          Pending Results     No orders found from 8/1/2018 to 8/4/2018.            Admission Information     Date & Time Provider Department Dept. Phone    8/3/2018 Jerome Ling MD Same Day Surgery [facility] [phone]      Your Vitals Were     Blood Pressure Temperature Respirations Height Weight Pulse Oximetry    171/95 97.7  F (36.5  C) 16 1.6 m (5' 3\") 55.1 kg (121 lb 7.6 oz) 100%    BMI (Body Mass Index)                   21.52 kg/m2           MyChart Information     [facility] gives you secure access to your electronic health record. If you see a primary care provider, you can also send messages to your care team and make appointments. If you have questions, please call your primary care clinic.  If you do not have a primary care provider, please call [phone] and they will assist you.        Care EveryWhere ID     This is your Care EveryWhere ID. This could be used by other organizations to access your [facility] medical records  [id number]        Equal Access to Services     MARSHALL PISANO : Teodoro Tavarez, yahaira johnston, douglas bhardwaj. So [facility] [phone].    ATENCIÓN: Si habla español, tiene a denton disposición servicios gratuitos de asistencia lingüística. Llame al [phone].    We comply with applicable federal civil rights laws and [place] laws. We do not discriminate on the basis of race, color, national origin, age, disability, sex, sexual " orientation, or gender identity.               Review of your medicines      CONTINUE these medicines which have NOT CHANGED        Dose / Directions    amLODIPine 2.5 MG tablet   Commonly known as:  NORVASC        At Bedtime   Refills:  0       atorvastatin 5 mg half-tab   Commonly known as:  LIPITOR        At Bedtime   Refills:  0       baclofen 10 MG tablet   Commonly known as:  LIORESAL        daily   Refills:  0       busPIRone 5 MG tablet   Commonly known as:  BUSPAR        daily   Refills:  0       diazepam 5 MG tablet   Commonly known as:  VALIUM   Used for:  Anxiety        Take 1 tablet 20 minutes prior to MRI. May repeat if not effective.   Quantity:  2 tablet   Refills:  0       lamoTRIgine 25 MG tablet   Commonly known as:  LaMICtal        daily   Refills:  0       LORazepam 0.5 MG tablet   Commonly known as:  ATIVAN        as needed   Refills:  0       mirtazapine 15 MG tablet   Commonly known as:  REMERON        daily   Refills:  0       ondansetron 4 MG ODT tab   Commonly known as:  ZOFRAN-ODT        as needed   Refills:  0       traMADol 50 MG tablet   Commonly known as:  ULTRAM        as needed   Refills:  0       TYLENOL 500 MG tablet   Generic drug:  acetaminophen        as needed   Refills:  0                Protect others around you: Learn how to safely use, store and throw away your medicines at www.disposemymeds.org.             Medication List: This is a list of all your medications and when to take them. Check marks below indicate your daily home schedule. Keep this list as a reference.      Medications           Morning Afternoon Evening Bedtime As Needed    amLODIPine 2.5 MG tablet   Commonly known as:  NORVASC   At Bedtime                                atorvastatin 5 mg half-tab   Commonly known as:  LIPITOR   At Bedtime                                baclofen 10 MG tablet   Commonly known as:  LIORESAL   daily                                busPIRone 5 MG tablet   Commonly known as:   BUSPAR   daily                                diazepam 5 MG tablet   Commonly known as:  VALIUM   Take 1 tablet 20 minutes prior to MRI. May repeat if not effective.                                lamoTRIgine 25 MG tablet   Commonly known as:  LaMICtal   daily                                LORazepam 0.5 MG tablet   Commonly known as:  ATIVAN   as needed                                mirtazapine 15 MG tablet   Commonly known as:  REMERON   daily                                ondansetron 4 MG ODT tab   Commonly known as:  ZOFRAN-ODT   as needed                                traMADol 50 MG tablet   Commonly known as:  ULTRAM   as needed                                TYLENOL 500 MG tablet   as needed   Generic drug:  acetaminophen

## 2018-08-03 NOTE — OR NURSING
Anesthesia, Dr. Ling saw patient before discharge and OK'd discharge to home. Patient taking juice and water at discharge. Given crackers to eat on the way home.

## 2018-08-03 NOTE — BRIEF OP NOTE
[facility] [place]    Brief Operative Note    Pre-operative diagnosis: Trigeminal Neuralgia   Post-operative diagnosis Trigeminal Neuralgia, post-radiofrequency lesioning  Procedure: Procedure(s):  Radio Frequency Percutaneous Right Trigeminal Rhizotomy - Wound Class: I-Clean  Surgeon: Surgeon(s) and Role:     * Jerome Ling MD - Primary     * Louie Ramsey MD - Resident - Assisting  Anesthesia: Monitored Anesthesia Care   Estimated blood loss: None  Drains: None  Specimens: None.  Findings:   Radiofrequency stimulation confirmed pain in V2 distribution on right side of face prior to lesioning.  Complications: None.  Implants: None.

## 2018-08-09 ENCOUNTER — TELEPHONE (OUTPATIENT)
Dept: NEUROSURGERY | Facility: CLINIC | Age: 78
End: 2018-08-09

## 2018-08-09 NOTE — TELEPHONE ENCOUNTER
"Spoke with patient.  Patient would like a 2 week post op CALL from DAMARIS Olivas as pt lives 5 hours from the clinic.    Patient requested to cancel 2 week appointment.    Pt also states has some diarrhea and stomach upset for past several days.  Feels like it is a \"bug\" and not from procedure on 8/3/18.    Pt to call PCP regarding symptoms.    Pt to callback for any facial pains or issues.    Voices understanding.  Note to  to cancel appointment.    "

## 2018-08-28 ENCOUNTER — PATIENT OUTREACH (OUTPATIENT)
Dept: NEUROSURGERY | Facility: CLINIC | Age: 78
End: 2018-08-28

## 2018-08-28 NOTE — PROGRESS NOTES
[facility]:  Care Coordination Note     SITUATION   Erma Aguero is a 77 year old female who is receiving support for:  Clinic Care Coordination - Post Hospital  .    BACKGROUND     8/3/18 Radio Frequency Percutaneous Right Trigeminal Rhizotomy    ASSESSMENT     See Post Op Disch Assessment  Patient has no facial pain and states doing well.  Requesting 2 week post op call from DMAARIS Olivas for tapering medication.                         PLAN          Nursing Interventions:       Follow-up plan: Refer to DAMAR[facility] Olivas Post OpTelephone Call :    tapering Lamictal 25mg BID       JOÃO CAMPOVERDE

## 2018-10-22 ENCOUNTER — TELEPHONE (OUTPATIENT)
Dept: NEUROSURGERY | Facility: CLINIC | Age: 78
End: 2018-10-22

## 2018-10-22 DIAGNOSIS — G50.0 TRIGEMINAL NEURALGIA: Primary | ICD-10-CM

## 2018-10-22 DIAGNOSIS — Z09 POSTOPERATIVE FOLLOW-UP: ICD-10-CM

## 2018-10-22 NOTE — TELEPHONE ENCOUNTER
NEUROSURGERY FOLLOW UP CALL    I have had a chance to speak with Mrs. Aguero today in further follow-up of her right-sided trigeminal neuralgia. She underwent the radiofrequency rhizotomy procedure by my colleague Dr. Jerome Ling on August 3, 2018 without complications.     The patient states that she has been doing very well.  She does get a tingly sensation in the right cheek about once a week, which is not bothersome.  She said she underwent a dental procedure about a week ago and tolerated that well.  She is very pleased with the surgical outcome so far and is ready to being taper off the medication.  She currently takes baclofen 10 mg twice daily and Lamictal 25 mg twice daily.    I suggest that she taper off the medications one at a time every 7 days. I instructed her to start reducing the Baclofen dosage to 5 mg twice daily and then 5 mg daily for the first 14 days.  After she finishes the baclofen, she can start cutting down the Lamictal to 12.5 mg twice daily and then 12.5 mg daily for another 14 days and then stop. I told the patient to give us a progress report in about 6 weeks after she completely wean off the medication.  I also told her not to hesitate to call sooner should she develop recurrent trigeminal neuralgia pain, withdrawal symptoms from the medications, and/or she she if she will she developed recurrent degenerative pains or symptoms from medication and or has any concerns.  She agreed.

## 2020-03-11 ENCOUNTER — HEALTH MAINTENANCE LETTER (OUTPATIENT)
Age: 80
End: 2020-03-11

## 2021-01-03 ENCOUNTER — HEALTH MAINTENANCE LETTER (OUTPATIENT)
Age: 81
End: 2021-01-03

## 2021-04-25 ENCOUNTER — HEALTH MAINTENANCE LETTER (OUTPATIENT)
Age: 81
End: 2021-04-25

## 2021-10-10 ENCOUNTER — HEALTH MAINTENANCE LETTER (OUTPATIENT)
Age: 81
End: 2021-10-10

## 2022-05-21 ENCOUNTER — HEALTH MAINTENANCE LETTER (OUTPATIENT)
Age: 82
End: 2022-05-21

## 2022-09-18 ENCOUNTER — HEALTH MAINTENANCE LETTER (OUTPATIENT)
Age: 82
End: 2022-09-18

## 2023-06-04 ENCOUNTER — HEALTH MAINTENANCE LETTER (OUTPATIENT)
Age: 83
End: 2023-06-04

## (undated) DEVICE — LABEL MEDICATION SYSTEM 3303-P

## (undated) DEVICE — DRSG BANDAID 1X3" FABRIC CURITY LATEX FREE KC44101

## (undated) DEVICE — LINEN TOWEL PACK X5 5464

## (undated) DEVICE — ADPT 5 IN 1 360

## (undated) DEVICE — GLOVE PROTEXIS MICRO 8.0  2D73PM80

## (undated) DEVICE — PAD GROUND RHIZOLYSIS DISP DGP-PM-25

## (undated) DEVICE — DRAPE MAYO STAND 23X54 8337

## (undated) DEVICE — SYR 10ML FINGER CONTROL W/O NDL 309695

## (undated) DEVICE — GOWN XLG DISP 9545

## (undated) DEVICE — PREP CHLORAPREP CLEAR 3ML 260400

## (undated) DEVICE — CUP AND LID 2PK 2OZ STERILE  SSK9006A

## (undated) DEVICE — NDL 15GA 1.5" 8881200029

## (undated) DEVICE — NDL 25GA 2"  8881200441

## (undated) DEVICE — NDL COUNTER 20CT 31142493

## (undated) DEVICE — GLOVE PROTEXIS BLUE W/NEU-THERA 8.5  2D73EB85

## (undated) DEVICE — DRSG GAUZE 4X4" TRAY 6939

## (undated) RX ORDER — FENTANYL CITRATE 50 UG/ML
INJECTION, SOLUTION INTRAMUSCULAR; INTRAVENOUS
Status: DISPENSED
Start: 2018-08-03

## (undated) RX ORDER — CEFAZOLIN SODIUM 2 G/100ML
INJECTION, SOLUTION INTRAVENOUS
Status: DISPENSED
Start: 2018-08-03

## (undated) RX ORDER — LIDOCAINE HYDROCHLORIDE 10 MG/ML
INJECTION, SOLUTION EPIDURAL; INFILTRATION; INTRACAUDAL; PERINEURAL
Status: DISPENSED
Start: 2018-08-03

## (undated) RX ORDER — IBUPROFEN 400 MG/1
TABLET, FILM COATED ORAL
Status: DISPENSED
Start: 2018-08-03